# Patient Record
Sex: FEMALE | Race: WHITE | URBAN - NONMETROPOLITAN AREA
[De-identification: names, ages, dates, MRNs, and addresses within clinical notes are randomized per-mention and may not be internally consistent; named-entity substitution may affect disease eponyms.]

---

## 2022-03-21 ENCOUNTER — OFFICE VISIT (OUTPATIENT)
Dept: URBAN - NONMETROPOLITAN AREA CLINIC 6 | Facility: CLINIC | Age: 57
End: 2022-03-21
Payer: COMMERCIAL

## 2022-03-21 DIAGNOSIS — H52.4 PRESBYOPIA: Primary | ICD-10-CM

## 2022-03-21 PROCEDURE — 92004 COMPRE OPH EXAM NEW PT 1/>: CPT | Performed by: OPTOMETRIST

## 2022-03-21 ASSESSMENT — VISUAL ACUITY
OD: 20/20
OS: 20/20

## 2022-03-21 ASSESSMENT — INTRAOCULAR PRESSURE
OS: 17
OD: 17

## 2022-03-21 NOTE — IMPRESSION/PLAN
Impression: Presbyopia: H52.4. Plan: Diagnosis discussed with patient in detail. Monitor. No need for correction at this time. Continue to use OCT readers as needed. Recommend trying +2.00/+2.50. If vision doesn't improve, recommend bifocal glasses.

## 2023-05-04 ENCOUNTER — OFFICE VISIT (OUTPATIENT)
Dept: URBAN - NONMETROPOLITAN AREA CLINIC 6 | Facility: CLINIC | Age: 58
End: 2023-05-04
Payer: COMMERCIAL

## 2023-05-04 DIAGNOSIS — H52.4 PRESBYOPIA: Primary | ICD-10-CM

## 2023-05-04 PROCEDURE — 92012 INTRM OPH EXAM EST PATIENT: CPT | Performed by: OPTOMETRIST

## 2023-05-04 ASSESSMENT — INTRAOCULAR PRESSURE
OS: 17
OD: 16

## 2023-05-04 NOTE — IMPRESSION/PLAN
Impression: Presbyopia: H52.4. Plan: Diagnosis discussed with patient in detail. No need for correction at this time. Continue to monitor. OTC readers are working well for patient.

## 2024-05-06 ENCOUNTER — OFFICE VISIT (OUTPATIENT)
Dept: URBAN - NONMETROPOLITAN AREA CLINIC 6 | Facility: CLINIC | Age: 59
End: 2024-05-06
Payer: COMMERCIAL

## 2024-05-06 DIAGNOSIS — H52.03 HYPERMETROPIA, BILATERAL: Primary | ICD-10-CM

## 2024-05-06 DIAGNOSIS — H52.4 PRESBYOPIA: ICD-10-CM

## 2024-05-06 PROCEDURE — 92014 COMPRE OPH EXAM EST PT 1/>: CPT | Performed by: OPTOMETRIST

## 2024-05-06 ASSESSMENT — INTRAOCULAR PRESSURE
OS: 16
OD: 16

## 2024-05-06 ASSESSMENT — VISUAL ACUITY
OD: 20/20
OS: 20/20